# Patient Record
(demographics unavailable — no encounter records)

---

## 2024-11-11 NOTE — HISTORY OF PRESENT ILLNESS
[de-identified] : Patient presents for followup on hypertension/hyperlipidemia/hypothyroid. Patient is currently on Toprol/losartan for hypertension, on lovastatin for hyperlipidemia and is on levothyroxine for hypothyroidism. -still doing all ADL's -got flu shot @ pharm

## 2024-11-11 NOTE — PHYSICAL EXAM
[] : no respiratory distress [General Appearance - In No Acute Distress] : in no acute distress [Respiration, Rhythm And Depth] : normal respiratory rhythm and effort [Auscultation Breath Sounds / Voice Sounds] : lungs were clear to auscultation bilaterally [Heart Rate And Rhythm] : heart rate was normal and rhythm regular [Affect] : the affect was normal [Mood] : the mood was normal [FreeTextEntry1] : +Murmur

## 2024-11-11 NOTE — ASSESSMENT
[FreeTextEntry1] : Venipuncture done in our office, Labs sent out/further recommendations will be made based on lab results. Patient advised to continue present medications with diet/exercise and specialist followup. Patient will return to the office in april for CP   patient has never had a colonoscopy  Mammogram was 11/16-declines follow up Bone density was 11/16-declines treatment and follow up Carotid sonogram via cardiology shingles/prevnar vaccine=declines, +got covid vaccines Patient currently seeing the following specialists.. 1. Cardiology-Dr. Bustos 2. Ophthalmology-Dr. Kong 3.dermatology for skin survey Dr. Baron 4.orthopedic-Dr. Capone in [place]/Dr. Owen 5.-Dr. Butler-no need for followup 6.Hand MD-Dr. Lao 7.Podiatry=Dr. Edwards -No longer sees GYN Hepatitis C screening=4/2018 echo via cardio.

## 2024-11-11 NOTE — HISTORY OF PRESENT ILLNESS
[de-identified] : Patient presents for followup on hypertension/hyperlipidemia/hypothyroid. Patient is currently on Toprol/losartan for hypertension, on lovastatin for hyperlipidemia and is on levothyroxine for hypothyroidism. -still doing all ADL's -got flu shot @ pharm

## 2025-02-13 NOTE — ASSESSMENT
[FreeTextEntry1] : Venipuncture done in our office, Labs sent out/further recommendations will be made based on lab results. Patient advised to continue present medications with diet/exercise and specialist followup. Patient will return to the office in 3-4months for CP   patient has never had a colonoscopy  Mammogram was 11/16-declines follow up Bone density was 11/16-declines treatment and follow up Carotid sonogram via cardiology shingles/prevnar vaccine=declines, +got covid vaccines Patient currently seeing the following specialists.. 1. Cardiology-Dr. Bustos 2. Ophthalmology-Dr. Kong 3.dermatology for skin survey Dr. Baron 4.orthopedic-Dr. Capone in /Dr. Owen 5.-Dr. Butler-no need for followup 6.Hand MD-Dr. Lao 7.Podiatry=Dr. Edwards -No longer sees GYN Hepatitis C screening=4/2018 echo via cardio.

## 2025-02-13 NOTE — HISTORY OF PRESENT ILLNESS
[de-identified] : Patient presents for followup on hypertension/hyperlipidemia/hypothyroid. Patient is currently on Toprol/losartan for hypertension, on lovastatin for hyperlipidemia and is on levothyroxine for hypothyroidism. -still doing all ADL's -Patient reports fall 1 month ago, went to urgent care with negative x-rays "ok"

## 2025-02-13 NOTE — HISTORY OF PRESENT ILLNESS
[de-identified] : Patient presents for followup on hypertension/hyperlipidemia/hypothyroid. Patient is currently on Toprol/losartan for hypertension, on lovastatin for hyperlipidemia and is on levothyroxine for hypothyroidism. -still doing all ADL's -Patient reports fall 1 month ago, went to urgent care with negative x-rays "ok"

## 2025-03-12 NOTE — HISTORY OF PRESENT ILLNESS
[de-identified] : This is a 95 year old F pt presents today with left hip pain. The pain has been there for since 2/28/25 due to an injury. Pt reports falling at home for the second time. The first fall occurred in January. After the second fall, she is unable to bend her leg in order to put clothes on or socks. Pt is ambulating with the use of a cane after the injury. Uses a walker at home for balance. She presents today with chief complaint of intermittent, moderate dull aching pain into the groin and along the lateral aspect of the hip.  No radicular pain or paresthesia. Pt has sharp pain. Reports pain while placing on socks or shoes, walking up/downstairs, and during daily activities. No prior treatments of injections or physical therapy noted. No constitutional symptoms noted.

## 2025-03-12 NOTE — DISCUSSION/SUMMARY
[Medication Risks Reviewed] : Medication risks reviewed [Surgical risks reviewed] : Surgical risks reviewed [de-identified] : Patient is a 95-year-old female here today for initial evaluation after a fall approximately 1 week ago.  She states that she has severe pain in her hip difficulty with raising her leg putting on socks and shoes go up and down stairs.  She is now needing to use a cane and walker to ambulate due to her pain.  Her x-ray shows no acute fracture however I am concerned that she could have a nondisplaced fracture.  For that reason I recommend an MRI of her left hip for further dilation management.  We discussed protected weightbearing.  She will ice and elevate the leg.  I will see her back after the MRI for repeat evaluation management.  All questions were asked and answered

## 2025-03-12 NOTE — PHYSICAL EXAM
[de-identified] : GENERAL APPEARANCE: Well nourished and hydrated, pleasant, alert, and oriented x 3. Appears their stated age. HEENT: Normocephalic, extraocular eye motion intact. Nasal septum midline. Oral cavity clear. External auditory canal clear. RESPIRATORY: Breath sounds clear and audible in all lobes. No wheezing, No accessory muscle use. CARDIOVASCULAR: No apparent abnormalities. No lower leg edema. No varicosities. Pedal pulses are palpable. NEUROLOGIC: Sensation is normal, no muscle weakness in the upper or lower extremities. DERMATOLOGIC: No apparent skin lesions, moist, warm, no rash. SPINE: Cervical spine appears normal and moves freely; thoracic spine appears normal and moves freely; lumbosacral spine appears normal and moves freely, normal, nontender. MUSCULOSKELETAL: Hands, wrists, and elbows are normal and move freely, shoulders are normal and move freely. PSYCHIATRIC: Oriented to person, place, and time, insight and judgement were intact and the affect was normal. DTRs: Biceps, brachioradialis, triceps, patellar, ankle and plantar 2+ and symmetric bilaterally. Pulses: dorsalis pedis, posterior tibial, femoral, popliteal, and radial 2+ and symmetric bilaterally. Constitutional: Alert and in no acute distress, but well-appearing. [de-identified] : left hip exam showed no groin pain with SLR, ROM is full flexion with 40 degrees of external rotation and 20 degrees of internal rotation without pain, CHERELLE negative, FADIR positive. Tenderness palpation over the greater trochanter.  5/5 motor strength in bilateral lower extremities. Sensory: Intact in bilateral lower extremities.    [de-identified] : AP pelvis and 2 views of the left hip obtained the office today show no acute fracture or dislocation.  No significant degenerative changes noted

## 2025-03-12 NOTE — ADDENDUM
[FreeTextEntry1] : This note was written by Sarina Solano, acting as the  for Dr. Thomason on 03/12/2025. This note accurately reflects the work and decisions made by Dr. Thomason.

## 2025-05-06 NOTE — HISTORY OF PRESENT ILLNESS
[FreeTextEntry1] : 95-year-old female who presents for her annual wellness visit  History includes hypertension, hyperlipidemia, prediabetes, hypothyroidism post thyroidectomy.   The patient had a history of aortic stenosis which was severe and ultimately had a TAVR 2019 with permanent pacemaker. Cardiac catheterization showed mild to moderate CAD. Cardiac PET scan 2021 without signs of ischemia or infarct with LAD and circumflex calcifications. Echocardiogram May 2022 with normal LVEF with moderate LVH with diastolic dysfunction and bioprosthetic aortic valve seated well with no AI or AS. Follow-up echocardiogram 2025 with normal LVEF with diastolic dysfunction with TAVR seated well without aortic regurgitation. Nuclear stress test 2024 with small infarct without ischemia.  She fell in 2018 sustaining a dislocation of her left wrist, right radial head fracture, right superior pubic ramus fracture, and right acetabular fracture. No surgical intervention was needed. The patient was transferred to subacute rehabilitation, where she was for about a month.  Unfortunately the patient again had a fall 2020 with multitrauma including fractures of the proximal humerus on the right, right ribs, right wrist, face, sternum and right scapula No surgery needed for any fractures. Ultimately improved and went home. No falls since  2009 she had community-acquired pneumonia with hospitalization with resolution She also had pneumonia in  when she had pneumonia which she states "I was really sick" and took her some time to recuperate.  Overall she states she is feeling fairly well without any significant symptoms   The patient had left wrist surgery 2023 with slow progressive improvement.  Continues to follow with orthopedic Dr Lao  Sadly her 60-year-old son  suddenly 2017 while shoveling snow with an acute MI. She has coped with this fairly well especially with continuing contact with her immediate family. Unfortunately her other son Emmanuel had a stroke 2023 at 58 years old.  He has recovered fairly well except that he continues with dysphagia and is being fed by G-tube.  The patient has been living in his home helping him out.  She is coping with this fairly well.  She is somewhat upset where her son feels that she should not be driving any longer with patient stating there was no incident and is upset because it takes away her independence to go to places such as [employer] and the [employer] as she pleases.  Her son works in the [employer] so he is not very available to take her places.  Also the patient's  of many years passed away in 2020. The patient currently lives alone and is doing well with close supervision by her son Emmanuel.

## 2025-05-06 NOTE — HEALTH RISK ASSESSMENT
[Very Good] : ~his/her~ current health as very good [None] : Patient does not have any barriers to medication adherence [Yes] : Reviewed medication list for presence of high-risk medications. 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

## 2025-05-06 NOTE — DATA REVIEWED
[FreeTextEntry1] : echocardiogram April 2025 with normal LVEF with diastolic dysfunction with TAVR seated well without aortic regurgitation. Nuclear stress test June 2024 with small infarct without ischemia.

## 2025-05-06 NOTE — ASSESSMENT
[FreeTextEntry1] : 95-year-old female who appears younger than her stated age currently medically stable with controlled hypertension on present medications.  Progressive severe aortic stenosis status post TAVR October 2019 with permanent pacemaker. Catheterization showed mild to moderate CAD. Echocardiogram May 2022 shows bioprosthetic aortic valve seated well without AI or AS. Follow-up echocardiogram April 2025 with normal LVEF with diastolic dysfunction with well-seated TAVR without AR. Nuclear stress test June 2024 with small infarct without ischemia  She is status post fall with multi-fracture August 2018 . Patient again fell July 2020 with multitrauma including fractures of right humerus, ribs and scapula. She has recovered well. Again discussion about avoidance of falls as much as possible and the patient now ambulates with a cane and feels more secure. She continues to drive without issues. No falls since  Postoperative hypothyroidism on levothyroxine.  Coping fairly well with her 60-year-old son passing away in 2017 and now her 58-year-old son having had a stroke. She continues to live alone with her son watching over her who recently told the patient not to drive.  Patient is upset about this and unsure why.  I offered to speak with her son but she states she will speak with him and discuss it.  Patient is to continue present medications  Colonoscopy-has never had therefore at 94 years old needs none other than if any issue Mammography November 2016. Declines followup Bone density November 2016 with osteoporosis and declined to treatment.Declines folllowup   High dose Influenza and COVID vaccine already received Shingles vaccine discussed and patient declines. Received pneumococcal 23 vaccine with Prevnar 20 declined Tdap October 2016  Venipuncture done today in the office Followup in 3 months